# Patient Record
Sex: FEMALE | Race: WHITE
[De-identification: names, ages, dates, MRNs, and addresses within clinical notes are randomized per-mention and may not be internally consistent; named-entity substitution may affect disease eponyms.]

---

## 2022-08-25 ENCOUNTER — HOSPITAL ENCOUNTER (EMERGENCY)
Dept: HOSPITAL 75 - ER FS | Age: 32
Discharge: HOME | End: 2022-08-25
Payer: OTHER GOVERNMENT

## 2022-08-25 VITALS — SYSTOLIC BLOOD PRESSURE: 105 MMHG | DIASTOLIC BLOOD PRESSURE: 62 MMHG

## 2022-08-25 VITALS — WEIGHT: 133.82 LBS | HEIGHT: 61.97 IN | BODY MASS INDEX: 24.63 KG/M2

## 2022-08-25 DIAGNOSIS — Z28.310: ICD-10-CM

## 2022-08-25 DIAGNOSIS — R42: Primary | ICD-10-CM

## 2022-08-25 LAB
ALBUMIN SERPL-MCNC: 4.4 GM/DL (ref 3.2–4.5)
ALP SERPL-CCNC: 56 U/L (ref 40–136)
ALT SERPL-CCNC: 10 U/L (ref 0–55)
AMORPH SED URNS QL MICRO: (no result) /LPF
APTT PPP: YELLOW S
BACTERIA #/AREA URNS HPF: (no result) /HPF
BASOPHILS # BLD AUTO: 0.1 10^3/UL (ref 0–0.1)
BASOPHILS NFR BLD AUTO: 2 % (ref 0–10)
BILIRUB SERPL-MCNC: 0.3 MG/DL (ref 0.1–1)
BILIRUB UR QL STRIP: NEGATIVE
BUN/CREAT SERPL: 14
CALCIUM SERPL-MCNC: 8.8 MG/DL (ref 8.5–10.1)
CHLORIDE SERPL-SCNC: 104 MMOL/L (ref 98–107)
CO2 SERPL-SCNC: 23 MMOL/L (ref 21–32)
CREAT SERPL-MCNC: 0.49 MG/DL (ref 0.6–1.3)
EOSINOPHIL # BLD AUTO: 0.1 10^3/UL (ref 0–0.3)
EOSINOPHIL NFR BLD AUTO: 3 % (ref 0–10)
FIBRINOGEN PPP-MCNC: (no result) MG/DL
GFR SERPLBLD BASED ON 1.73 SQ M-ARVRAT: 127 ML/MIN
GLUCOSE SERPL-MCNC: 118 MG/DL (ref 70–105)
GLUCOSE UR STRIP-MCNC: NEGATIVE MG/DL
HCT VFR BLD CALC: 37 % (ref 35–52)
HGB BLD-MCNC: 12.4 G/DL (ref 11.5–16)
KETONES UR QL STRIP: NEGATIVE
LEUKOCYTE ESTERASE UR QL STRIP: NEGATIVE
LYMPHOCYTES # BLD AUTO: 1.7 10^3/UL (ref 1–4)
LYMPHOCYTES NFR BLD AUTO: 39 % (ref 12–44)
MANUAL DIFFERENTIAL PERFORMED BLD QL: NO
MCH RBC QN AUTO: 29 PG (ref 25–34)
MCHC RBC AUTO-ENTMCNC: 34 G/DL (ref 32–36)
MCV RBC AUTO: 86 FL (ref 80–99)
MONOCYTES # BLD AUTO: 0.3 10^3/UL (ref 0–1)
MONOCYTES NFR BLD AUTO: 8 % (ref 0–12)
NEUTROPHILS # BLD AUTO: 2.1 10^3/UL (ref 1.8–7.8)
NEUTROPHILS NFR BLD AUTO: 47 % (ref 42–75)
NITRITE UR QL STRIP: NEGATIVE
PH UR STRIP: 8 [PH] (ref 5–9)
PLATELET # BLD: 356 10^3/UL (ref 130–400)
PMV BLD AUTO: 9.7 FL (ref 9–12.2)
POTASSIUM SERPL-SCNC: 3.8 MMOL/L (ref 3.6–5)
PROT SERPL-MCNC: 6.6 GM/DL (ref 6.4–8.2)
PROT UR QL STRIP: NEGATIVE
RBC #/AREA URNS HPF: (no result) /HPF
SODIUM SERPL-SCNC: 137 MMOL/L (ref 135–145)
SP GR UR STRIP: 1.02 (ref 1.02–1.02)
SQUAMOUS #/AREA URNS HPF: (no result) /HPF
WBC # BLD AUTO: 4.4 10^3/UL (ref 4.3–11)
WBC #/AREA URNS HPF: (no result) /HPF

## 2022-08-25 PROCEDURE — 85025 COMPLETE CBC W/AUTO DIFF WBC: CPT

## 2022-08-25 PROCEDURE — 84703 CHORIONIC GONADOTROPIN ASSAY: CPT

## 2022-08-25 PROCEDURE — 80053 COMPREHEN METABOLIC PANEL: CPT

## 2022-08-25 PROCEDURE — 81000 URINALYSIS NONAUTO W/SCOPE: CPT

## 2022-08-25 PROCEDURE — 36415 COLL VENOUS BLD VENIPUNCTURE: CPT

## 2022-08-25 PROCEDURE — 93005 ELECTROCARDIOGRAM TRACING: CPT

## 2022-08-25 NOTE — ED GENERAL
General


Chief Complaint:  Dizziness/Syncope


Stated Complaint:  DIZZINESS





History of Present Illness


Date Seen by Provider:  Aug 25, 2022


Time Seen by Provider:  12:18


Initial Comments


32-year-old female presents with dizziness.  She reports start about an hour 

hour and a half prior to arrival.  She reports that she had sat down just got 

really dizzy.  It gets worse with movement or when she stands.  She does not 

have any nausea or vomiting.  She does report that she has had couple days of 

diarrhea.  No cough, sore throat, shortness of breath, fever or chills.  She 

took a COVID test at home that was negative.  She denies any fever or chills.  

She reports her last menstrual period ended about a week ago.





Allergies and Home Medications


Allergies


Coded Allergies:  


     No Known Drug Allergies (Unverified , 8/25/22)





Patient Home Medication List


Home Medication List Reviewed:  Yes





Review of Systems


Review of Systems


Constitutional:  No chills; dizziness; No fever, No weakness


EENTM:  No ear pain


Respiratory:  No cough, No short of breath


Cardiovascular:  No chest pain, No palpitations


Gastrointestinal:  No abdominal pain; diarrhea; No nausea


Musculoskeletal:  no symptoms reported


Skin:  no symptoms reported


Psychiatric/Neurological:  No Symptoms Reported


Hematologic/Lymphatic:  No Symptoms Reported





Physical Exam


Vital Signs





Vital Signs - First Documented








 8/25/22





 12:13


 


Temp 36.7


 


Pulse 52


 


Resp 18


 


B/P (MAP) 94/56 (69)


 


Pulse Ox 100


 


O2 Delivery Room Air





Capillary Refill :


Height, Weight, BMI


Height: '"


Weight: lbs. oz. kg;  BMI


Method:


General Appearance:  No Apparent Distress, WD/WN


Neck:  Non Tender, Supple


Respiratory:  Normal Breath Sounds, No Accessory Muscle Use, No Respiratory 

Distress


Cardiovascular:  Regular Rate, Rhythm, No Edema


Gastrointestinal:  Non Tender, Soft


Extremity:  Normal Capillary Refill, Normal Inspection, Normal Range of Motion


Neurologic/Psychiatric:  Alert, Oriented x3, No Motor/Sensory Deficits, Normal 

Mood/Affect, CNs II-XII Norm as Tested


Skin:  Normal Color, Warm/Dry


Lymphatic:  No Adenopathy





Progress/Results/Core Measures


Suspected Sepsis


SIRS


Temperature: 


Pulse:  


Respiratory Rate: 


 


Laboratory Tests


8/25/22 12:40: White Blood Count 4.4


Blood Pressure  / 


Mean: 


 





Laboratory Tests


8/25/22 12:40: 


Creatinine 0.49L, Platelet Count 356, Total Bilirubin 0.3








Results/Orders


Lab Results





Laboratory Tests








Test


 8/25/22


12:14 8/25/22


12:40 Range/Units


 


 


Urine Color YELLOW    


 


Urine Clarity CLOUDY    


 


Urine pH 8.0   5-9  


 


Urine Specific Gravity 1.020   1.016-1.022  


 


Urine Protein NEGATIVE   NEGATIVE  


 


Urine Glucose (UA) NEGATIVE   NEGATIVE  


 


Urine Ketones NEGATIVE   NEGATIVE  


 


Urine Nitrite NEGATIVE   NEGATIVE  


 


Urine Bilirubin NEGATIVE   NEGATIVE  


 


Urine Urobilinogen 0.2   < = 1.0  MG/DL


 


Urine Leukocyte Esterase NEGATIVE   NEGATIVE  


 


Urine RBC (Auto) NEGATIVE   NEGATIVE  


 


Urine RBC NONE    /HPF


 


Urine WBC NONE    /HPF


 


Urine Squamous Epithelial


Cells 0-2 


 


  /HPF





 


Urine Crystals PRESENT H   /LPF


 


Urine Amorphous Sediment


 FEW BOLIVAR


PHOSPHATE H 


  /LPF





 


Urine Bacteria FEW H   /HPF


 


Urine Casts NONE    /LPF


 


Urine Mucus SMALL H   /LPF


 


Urine Culture Indicated NO    


 


Urine Pregnancy Test NEGATIVE   NEGATIVE  


 


White Blood Count


 


 4.4 


 4.3-11.0


10^3/uL


 


Red Blood Count


 


 4.24 


 3.80-5.11


10^6/uL


 


Hemoglobin  12.4  11.5-16.0  g/dL


 


Hematocrit  37  35-52  %


 


Mean Corpuscular Volume  86  80-99  fL


 


Mean Corpuscular Hemoglobin  29  25-34  pg


 


Mean Corpuscular Hemoglobin


Concent 


 34 


 32-36  g/dL





 


Red Cell Distribution Width  12.3  10.0-14.5  %


 


Platelet Count


 


 356 


 130-400


10^3/uL


 


Mean Platelet Volume  9.7  9.0-12.2  fL


 


Immature Granulocyte % (Auto)  0   %


 


Neutrophils (%) (Auto)  47  42-75  %


 


Lymphocytes (%) (Auto)  39  12-44  %


 


Monocytes (%) (Auto)  8  0-12  %


 


Eosinophils (%) (Auto)  3  0-10  %


 


Basophils (%) (Auto)  2  0-10  %


 


Neutrophils # (Auto)


 


 2.1 


 1.8-7.8


10^3/uL


 


Lymphocytes # (Auto)


 


 1.7 


 1.0-4.0


10^3/uL


 


Monocytes # (Auto)


 


 0.3 


 0.0-1.0


10^3/uL


 


Eosinophils # (Auto)


 


 0.1 


 0.0-0.3


10^3/uL


 


Basophils # (Auto)


 


 0.1 


 0.0-0.1


10^3/uL


 


Immature Granulocyte # (Auto)


 


 0.0 


 0.0-0.1


10^3/uL


 


Sodium Level  137  135-145  MMOL/L


 


Potassium Level  3.8  3.6-5.0  MMOL/L


 


Chloride Level  104    MMOL/L


 


Carbon Dioxide Level  23  21-32  MMOL/L


 


Anion Gap  10  5-14  MMOL/L


 


Blood Urea Nitrogen  7  7-18  MG/DL


 


Creatinine


 


 0.49 L


 0.60-1.30


MG/DL


 


Estimat Glomerular Filtration


Rate 


 127 


  





 


BUN/Creatinine Ratio  14   


 


Glucose Level  118 H   MG/DL


 


Calcium Level  8.8  8.5-10.1  MG/DL


 


Corrected Calcium  8.5  8.5-10.1  MG/DL


 


Total Bilirubin  0.3  0.1-1.0  MG/DL


 


Aspartate Amino Transf


(AST/SGOT) 


 15 


 5-34  U/L





 


Alanine Aminotransferase


(ALT/SGPT) 


 10 


 0-55  U/L





 


Alkaline Phosphatase  56    U/L


 


Total Protein  6.6  6.4-8.2  GM/DL


 


Albumin  4.4  3.2-4.5  GM/DL








My Orders





Orders - MONTILLA,CATA L DO


Cbc With Automated Diff (8/25/22 12:26)


Comprehensive Metabolic Panel (8/25/22 12:26)


Hcg,Qualitative Urine (8/25/22 12:26)


Ua Culture If Indicated (8/25/22 12:26)


Ekg Tracing (8/25/22 12:26)


Orthostatic Vital Signs (Adult (8/25/22 12:26)


Lactated Ringers (Lr 1000 Ml Iv Solution (8/25/22 12:26)


Meclizine Tablet (Antivert Tablet) (8/25/22 12:30)





Medications Given in ED





Current Medications








 Medications  Dose


 Ordered  Sig/Cordell


 Route  Start Time


 Stop Time Status Last Admin


Dose Admin


 


 Meclizine HCl  25 mg  ONCE  ONCE


 PO  8/25/22 12:30


 8/25/22 12:31 DC 8/25/22 12:41


25 MG








Vital Signs/I&O











 8/25/22





 12:13


 


Temp 36.7


 


Pulse 52


 


Resp 18


 


B/P (MAP) 94/56 (69)


 


Pulse Ox 100


 


O2 Delivery Room Air





Capillary Refill :


Progress Note :  


Progress Note


Patient's symptoms are seemingly improved following IV fluids and meclizine.  

Patient is stable otherwise with no acute findings on labs.  Discussed with her 

is likely a combination of may be some benign positional vertigo along with some

mild dehydration recommend that she drink plenty of fluids.  She can use 

over-the-counter Antivert or meclizine.  Patient stable discharged home





ECG


Initial ECG Impression Date:  Aug 25, 2022


Initial ECG Impression Time:  12:41


Initial ECG Rate:  56


Initial ECG Rhythm:  S.Erasto


Initial ECG Impression:  Sinus Bradycardia


Comment


No acute ST changes or ST elevation





Departure


Impression





   Primary Impression:  


   Vertigo


Disposition:  01 HOME, SELF-CARE


Condition:  Stable





Departure-Patient Inst.


Referrals:  


BOB NATH (PCP)


Primary Care Physician








GRETEL MANZANO DO (Family)


Primary Care Physician


Patient Instructions:  Vertigo (a Type of Dizziness) (DC)





Add. Discharge Instructions:  


Be sure to drink plenty of fluids


You may use over-the-counter Antivert, meclizine as needed.  Use as directed on 

package





All discharge instructions reviewed with patient and/or family. Voiced 

understanding.











CATA MONTILLA DO               Aug 25, 2022 12:18

## 2023-03-08 ENCOUNTER — HOSPITAL ENCOUNTER (OUTPATIENT)
Dept: HOSPITAL 75 - LABNPT | Age: 33
End: 2023-03-08
Attending: REGISTERED NURSE
Payer: OTHER GOVERNMENT

## 2023-03-08 DIAGNOSIS — N89.8: Primary | ICD-10-CM

## 2023-03-08 PROCEDURE — 87591 N.GONORRHOEAE DNA AMP PROB: CPT

## 2023-03-08 PROCEDURE — 87491 CHLMYD TRACH DNA AMP PROBE: CPT

## 2023-03-08 PROCEDURE — 87210 SMEAR WET MOUNT SALINE/INK: CPT

## 2023-03-14 ENCOUNTER — HOSPITAL ENCOUNTER (EMERGENCY)
Dept: HOSPITAL 75 - ER | Age: 33
Discharge: HOME | End: 2023-03-14
Payer: OTHER GOVERNMENT

## 2023-03-14 VITALS — BODY MASS INDEX: 17.03 KG/M2 | HEIGHT: 62.99 IN | WEIGHT: 96.12 LBS

## 2023-03-14 VITALS — DIASTOLIC BLOOD PRESSURE: 72 MMHG | SYSTOLIC BLOOD PRESSURE: 110 MMHG

## 2023-03-14 DIAGNOSIS — N73.0: ICD-10-CM

## 2023-03-14 DIAGNOSIS — N72: Primary | ICD-10-CM

## 2023-03-14 DIAGNOSIS — B37.31: ICD-10-CM

## 2023-03-14 DIAGNOSIS — Z28.310: ICD-10-CM

## 2023-03-14 LAB
ALBUMIN SERPL-MCNC: 4.3 GM/DL (ref 3.2–4.5)
ALP SERPL-CCNC: 37 U/L (ref 40–136)
ALT SERPL-CCNC: 18 U/L (ref 0–55)
APTT PPP: YELLOW S
BACTERIA #/AREA URNS HPF: (no result) /HPF
BASOPHILS # BLD AUTO: 0.1 10^3/UL (ref 0–0.1)
BASOPHILS NFR BLD AUTO: 2 % (ref 0–10)
BILIRUB SERPL-MCNC: 0.4 MG/DL (ref 0.1–1)
BILIRUB UR QL STRIP: NEGATIVE
BUN/CREAT SERPL: 10
CALCIUM SERPL-MCNC: 9 MG/DL (ref 8.5–10.1)
CHLORIDE SERPL-SCNC: 106 MMOL/L (ref 98–107)
CO2 SERPL-SCNC: 21 MMOL/L (ref 21–32)
CREAT SERPL-MCNC: 0.59 MG/DL (ref 0.6–1.3)
EOSINOPHIL # BLD AUTO: 0.2 10^3/UL (ref 0–0.3)
EOSINOPHIL NFR BLD AUTO: 3 % (ref 0–10)
FIBRINOGEN PPP-MCNC: CLEAR MG/DL
GFR SERPLBLD BASED ON 1.73 SQ M-ARVRAT: 121 ML/MIN
GLUCOSE SERPL-MCNC: 84 MG/DL (ref 70–105)
GLUCOSE UR STRIP-MCNC: NEGATIVE MG/DL
HCT VFR BLD CALC: 37 % (ref 35–52)
HGB BLD-MCNC: 12.5 G/DL (ref 11.5–16)
KETONES UR QL STRIP: (no result)
LEUKOCYTE ESTERASE UR QL STRIP: (no result)
LYMPHOCYTES # BLD AUTO: 2.1 10^3/UL (ref 1–4)
LYMPHOCYTES NFR BLD AUTO: 41 % (ref 12–44)
MANUAL DIFFERENTIAL PERFORMED BLD QL: NO
MCH RBC QN AUTO: 29 PG (ref 25–34)
MCHC RBC AUTO-ENTMCNC: 34 G/DL (ref 32–36)
MCV RBC AUTO: 87 FL (ref 80–99)
MONOCYTES # BLD AUTO: 0.4 10^3/UL (ref 0–1)
MONOCYTES NFR BLD AUTO: 9 % (ref 0–12)
NEUTROPHILS # BLD AUTO: 2.3 10^3/UL (ref 1.8–7.8)
NEUTROPHILS NFR BLD AUTO: 45 % (ref 42–75)
NITRITE UR QL STRIP: NEGATIVE
PH UR STRIP: 6 [PH] (ref 5–9)
PLATELET # BLD: 328 10^3/UL (ref 130–400)
PMV BLD AUTO: 9.8 FL (ref 9–12.2)
POTASSIUM SERPL-SCNC: 3.7 MMOL/L (ref 3.6–5)
PROT SERPL-MCNC: 6.8 GM/DL (ref 6.4–8.2)
PROT UR QL STRIP: NEGATIVE
RBC #/AREA URNS HPF: (no result) /HPF
SODIUM SERPL-SCNC: 138 MMOL/L (ref 135–145)
SP GR UR STRIP: <=1.005 (ref 1.02–1.02)
SQUAMOUS #/AREA URNS HPF: (no result) /HPF
WBC # BLD AUTO: 5 10^3/UL (ref 4.3–11)
WBC #/AREA URNS HPF: (no result) /HPF

## 2023-03-14 PROCEDURE — 36415 COLL VENOUS BLD VENIPUNCTURE: CPT

## 2023-03-14 PROCEDURE — 81000 URINALYSIS NONAUTO W/SCOPE: CPT

## 2023-03-14 PROCEDURE — 87210 SMEAR WET MOUNT SALINE/INK: CPT

## 2023-03-14 PROCEDURE — 87591 N.GONORRHOEAE DNA AMP PROB: CPT

## 2023-03-14 PROCEDURE — 84703 CHORIONIC GONADOTROPIN ASSAY: CPT

## 2023-03-14 PROCEDURE — 87491 CHLMYD TRACH DNA AMP PROBE: CPT

## 2023-03-14 PROCEDURE — 80053 COMPREHEN METABOLIC PANEL: CPT

## 2023-03-14 PROCEDURE — 85025 COMPLETE CBC W/AUTO DIFF WBC: CPT

## 2023-03-14 NOTE — ED GU-FEMALE
General


Chief Complaint:   - Reproductive


Stated Complaint:  BACK PAIN/VAG BURNING/FATIGUE


Nursing Triage Note:  


Patient states 1 mo ago states urinary problems, back pain, fatigue, states seen




a physician diagnosed with yeast infection, STD negative, diagnosed with BV 


treatment for that. Was exposed to Strept B in december.


Source:  patient


Exam Limitations:  no limitations





History of Present Illness


Date Seen by Provider:  Mar 14, 2023


Time Seen by Provider:  19:13


Initial Comments


32-year-old female presents to the ED with reports of burning in groin area, 

lower back pain, shivering, and loss of appetite.  States problem started with 

burning with urination and wiping 35 days ago.  She states she was seen a doctor

3 times for this.  Reports of burning with urination has improved, but still has

vaginal burning all the time.  States she was diagnosed with BV and completed 

the full course of vaginal Flagyl 2 days ago.  Her doctor also treated her for 

yeast infection, although she was negative for yeast infection.  She also 

reports that she is thirsty all the time, and is constantly drinking water.  

Denies fevers, chest pain, shortness of air, abdominal pain, nausea, vomiting.  

Had a normal bowel movement this morning.  Last menstrual cycle was February 16,

states her cycles are every 31 days.  Denies any vaginal bleeding or discharge. 

Denies dysuria.  Denies hematuria.  She reports she has 1 sexual partner, and is

in a monogamous relationship.  She is not concerned about STDs.  States she was 

recently checked for gonorrhea and chlamydia and it was negative.





Allergies and Home Medications


Allergies


Coded Allergies:  


     No Known Drug Allergies (Unverified , 8/25/22)





Patient Home Medication List


Home Medication List Reviewed:  Yes


Doxycycline Hyclate (Doxycycline Hyclate) 100 Mg Tablet, 100 MG PO BID


   Prescribed by: Raya Gonzalez on 3/14/23 2150


Fluconazole (Diflucan) 150 Mg Tablet, 150 MG PO ONCE


   Prescribed by: Raya Gonzalez on 3/14/23 2150


Metronidazole (Metronidazole) 500 Mg Tablet, 500 MG PO BID


   Prescribed by: Raya Gonzalez on 3/14/23 2150





Review of Systems


Review of Systems


Constitutional:  see HPI





Past Medical-Social-Family Hx


Immunizations Up To Date


Influenza Vaccine Up-to-Date:  No; Not Current


First/Initial COVID19 Vaccinat:  Not currently vaccinated


Second COVID19 Vaccination Raul:  Not currently vaccinated


Third COVID19 Vaccination Date:  Not currently vaccinated





Past Medical History


Surgery/Hospitalization HX:  


Cholecysectomy


Last Menstrual Period:  Feb 16, 2023





Physical Exam


Vital Signs





Vital Signs - First Documented








 3/14/23





 18:52


 


Temp 36.8


 


Pulse 90


 


Resp 20


 


B/P (MAP) 106/62 (77)


 


Pulse Ox 100


 


O2 Delivery Room Air





Capillary Refill : Less Than 3 Seconds


Height, Weight, BMI


Height: '"


Weight: lbs. oz. kg; 17.00 BMI


Method:


General Appearance:  WD/WN, no apparent distress


Neck:  supple, normal inspection


Cardiovascular:  regular rate, rhythm, no edema, no gallop, no JVD, no murmur


Respiratory:  lungs clear, normal breath sounds, no respiratory distress, no 

accessory muscle use


Gastrointestinal:  normal bowel sounds, non tender, soft, no organomegaly, no 

pulsatile mass


Pelvic:  discharge (White chunky, could be vaginal Flagyl), other (Erythemic 

cervix)


Extremities:  normal range of motion, normal inspection


Neurologic/Psychiatric:  alert, normal mood/affect


Skin:  normal color, warm/dry





Progress/Results/Core Measures


Suspected Sepsis


SIRS


Temperature: 


Pulse: 90 


Respiratory Rate: 20


 


Laboratory Tests


3/14/23 19:30: White Blood Count 5.0


Blood Pressure 106 /62 


Mean: 77


 





Laboratory Tests


3/14/23 19:30: Platelet Count 328


3/14/23 19:55: 


Creatinine 0.59L, Total Bilirubin 0.4








Results/Orders


Lab Results





Laboratory Tests








Test


 3/14/23


19:09 3/14/23


19:30 3/14/23


19:55 3/14/23


21:05 Range/Units


 


 


Urine Color YELLOW      


 


Urine Clarity CLEAR      


 


Urine pH 6.0     5-9  


 


Urine Specific Gravity <=1.005     1.016-1.022  


 


Urine Protein NEGATIVE     NEGATIVE  


 


Urine Glucose (UA) NEGATIVE     NEGATIVE  


 


Urine Ketones 1+ H    NEGATIVE  


 


Urine Nitrite NEGATIVE     NEGATIVE  


 


Urine Bilirubin NEGATIVE     NEGATIVE  


 


Urine Urobilinogen 0.2     < = 1.0  MG/DL


 


Urine Leukocyte Esterase TRACE H    NEGATIVE  


 


Urine RBC (Auto) NEGATIVE     NEGATIVE  


 


Urine RBC NONE      /HPF


 


Urine WBC NONE      /HPF


 


Urine Squamous Epithelial


Cells NONE 


 


 


 


  /HPF





 


Urine Crystals NONE      /LPF


 


Urine Bacteria TRACE      /HPF


 


Urine Casts NONE      /LPF


 


Urine Mucus NEGATIVE      /LPF


 


Urine Culture Indicated NO      


 


White Blood Count


 


 5.0 


 


 


 4.3-11.0


10^3/uL


 


Red Blood Count


 


 4.26 


 


 


 3.80-5.11


10^6/uL


 


Hemoglobin  12.5    11.5-16.0  g/dL


 


Hematocrit  37    35-52  %


 


Mean Corpuscular Volume  87    80-99  fL


 


Mean Corpuscular Hemoglobin  29    25-34  pg


 


Mean Corpuscular Hemoglobin


Concent 


 34 


 


 


 32-36  g/dL





 


Red Cell Distribution Width  12.5    10.0-14.5  %


 


Platelet Count


 


 328 


 


 


 130-400


10^3/uL


 


Mean Platelet Volume  9.8    9.0-12.2  fL


 


Immature Granulocyte % (Auto)  0     %


 


Neutrophils (%) (Auto)  45    42-75  %


 


Lymphocytes (%) (Auto)  41    12-44  %


 


Monocytes (%) (Auto)  9    0-12  %


 


Eosinophils (%) (Auto)  3    0-10  %


 


Basophils (%) (Auto)  2    0-10  %


 


Neutrophils # (Auto)


 


 2.3 


 


 


 1.8-7.8


10^3/uL


 


Lymphocytes # (Auto)


 


 2.1 


 


 


 1.0-4.0


10^3/uL


 


Monocytes # (Auto)


 


 0.4 


 


 


 0.0-1.0


10^3/uL


 


Eosinophils # (Auto)


 


 0.2 


 


 


 0.0-0.3


10^3/uL


 


Basophils # (Auto)


 


 0.1 


 


 


 0.0-0.1


10^3/uL


 


Immature Granulocyte # (Auto)


 


 0.0 


 


 


 0.0-0.1


10^3/uL


 


Sodium Level   138   135-145  MMOL/L


 


Potassium Level   3.7   3.6-5.0  MMOL/L


 


Chloride Level   106     MMOL/L


 


Carbon Dioxide Level   21   21-32  MMOL/L


 


Anion Gap   11   5-14  MMOL/L


 


Blood Urea Nitrogen   6 L  7-18  MG/DL


 


Creatinine


 


 


 0.59 L


 


 0.60-1.30


MG/DL


 


Estimat Glomerular Filtration


Rate 


 


 121 


 


  





 


BUN/Creatinine Ratio   10    


 


Glucose Level   84     MG/DL


 


Calcium Level   9.0   8.5-10.1  MG/DL


 


Corrected Calcium   8.8   8.5-10.1  MG/DL


 


Total Bilirubin   0.4   0.1-1.0  MG/DL


 


Aspartate Amino Transf


(AST/SGOT) 


 


 18 


 


 5-34  U/L





 


Alanine Aminotransferase


(ALT/SGPT) 


 


 18 


 


 0-55  U/L





 


Alkaline Phosphatase   37 L    U/L


 


Total Protein   6.8   6.4-8.2  GM/DL


 


Albumin   4.3   3.2-4.5  GM/DL








Micro Results





Microbiology


3/14/23 Wet Prep - Final, Complete


          





My Orders





Orders - RAYA GONZALEZ


Ua Culture If Indicated (3/14/23 19:04)


Urine Pregnancy Bedside (3/14/23 19:04)


Comprehensive Metabolic Panel (3/14/23 19:20)


Ed Iv/Invasive Line Start (3/14/23 19:20)


Cbc With Automated Diff (3/14/23 19:20)


Wet Prep (3/14/23 20:54)


Neisseria Gonorrhea Swab (3/14/23 20:54)


Chlamydia Trachomatis Swab (3/14/23 20:54)


Ceftriaxone (Rocephin) (3/14/23 21:45)


Lidocaine 1% Inj 20 Ml (Xylocaine 1% Inj (3/14/23 21:45)


Doxycycline Hyclate Tablet (Vibramycin T (3/14/23 21:45)


Metronidazole Tablet (Flagyl Tablet) (3/14/23 21:45)


Fluconazole Tablet (Ed Only) (Diflucan T (3/14/23 21:45)


Lidocaine 1% Inj 10 Ml (Xylocaine 1% Inj (3/14/23 22:11)





Medications Given in ED





Vital Signs/I&O


Capillary Refill : Less Than 3 Seconds








Blood Pressure Mean:                    77








Progress Note #1:  


   Time:  19:30


Progress Note


Patient seen and evaluated, resting comfortably in recliner, no acute distress. 

Based on exam and symptoms, work-up initiated including CBC, CMP, UA, urine 

pregnancy.


Progress Note #2:  


   Time:  20:30


Progress Note


Labs reviewed.  CBC grossly normal, white blood cell 5.0.  CMP mostly normal, 

potassium 3.7, creatinine 0.59, glucose 84.  UA positive for 1+ ketones, trace 

leuks, no WBC, trace bacteria.  Do not believe this is a urinary tract 

infection.





Results discussed with patient.  Will perform pelvic exam.


Progress Note #3:  


   Time:  21:40


Progress Note


Wet prep shows few day BCs, no clue cells few yeast, no trichomonas.  Cervix is 

erythemic.  Due to systemic symptoms of lower back pain and chills, will treat 

for PID and yeast infection.  Will give first dose of antibiotics now and 

Diflucan.  Will discharge with doxycycline and Flagyl and repeat dose of 

Diflucan if symptoms not improved.  Discharge instructions and return 

precautions provided.





Departure


Impression





   Primary Impression:  


   Cervicitis


   Additional Impressions:  


   Acute pelvic inflammatory disease


   Vulvovaginal candidiasis


Disposition:  01 HOME, SELF-CARE


Condition:  Stable





Departure-Patient Inst.


Decision time for Depature:  21:46


Referrals:  


WILLARD FITZPATRICK MD (PCP)


Primary Care Physician








ENMA PERAZA (Family)


Primary Care Physician


Patient Instructions:  Pelvic Inflammatory Disease, Yeast Infection (DC)





Add. Discharge Instructions:  


Complete full course of antibiotic, even begin to feel better.


Take the Diflucan after 72 hours, if the burning does not improve.


Follow-up with OB/GYN.


Return for uncontrolled pain, fever, recurrent vomiting, or any other new, 

concerning, or worsening symptoms.





All discharge instructions reviewed with patient and/or family. Voiced 

understanding.


Scripts


Metronidazole (Metronidazole) 500 Mg Tablet


500 MG PO BID for 14 Days, #27 TAB 0 Refills


   Prov: RAYA GONZALEZ         3/14/23 


Doxycycline Hyclate (Doxycycline Hyclate) 100 Mg Tablet


100 MG PO BID for 14 Days, #27 TAB 0 Refills


   Prov: RAYA GONZALEZ APRN         3/14/23 


Fluconazole (Diflucan) 150 Mg Tablet


150 MG PO ONCE, #1 TAB 0 Refills


   Prov: RAYA GONZALEZ         3/14/23











RAYA GONZALEZ        Mar 14, 2023 19:26